# Patient Record
Sex: MALE | Race: WHITE
[De-identification: names, ages, dates, MRNs, and addresses within clinical notes are randomized per-mention and may not be internally consistent; named-entity substitution may affect disease eponyms.]

---

## 2021-05-17 ENCOUNTER — HOSPITAL ENCOUNTER (EMERGENCY)
Dept: HOSPITAL 47 - EC | Age: 61
End: 2021-05-17
Payer: COMMERCIAL

## 2021-05-17 VITALS — TEMPERATURE: 97.9 F

## 2021-05-17 DIAGNOSIS — Z88.0: ICD-10-CM

## 2021-05-17 DIAGNOSIS — J96.01: ICD-10-CM

## 2021-05-17 DIAGNOSIS — D64.9: ICD-10-CM

## 2021-05-17 DIAGNOSIS — I10: ICD-10-CM

## 2021-05-17 DIAGNOSIS — E87.5: ICD-10-CM

## 2021-05-17 DIAGNOSIS — E87.2: ICD-10-CM

## 2021-05-17 DIAGNOSIS — Z86.16: ICD-10-CM

## 2021-05-17 DIAGNOSIS — Z46.82: ICD-10-CM

## 2021-05-17 DIAGNOSIS — E03.9: ICD-10-CM

## 2021-05-17 DIAGNOSIS — E87.0: ICD-10-CM

## 2021-05-17 DIAGNOSIS — I46.9: Primary | ICD-10-CM

## 2021-05-17 DIAGNOSIS — Z79.01: ICD-10-CM

## 2021-05-17 DIAGNOSIS — Z79.899: ICD-10-CM

## 2021-05-17 DIAGNOSIS — Z87.11: ICD-10-CM

## 2021-05-17 LAB
ALBUMIN SERPL-MCNC: 2.2 G/DL (ref 3.5–5)
ALP SERPL-CCNC: 134 U/L (ref 38–126)
ALT SERPL-CCNC: 85 U/L (ref 4–49)
ANION GAP SERPL CALC-SCNC: 21 MMOL/L
APTT BLD: 17.9 SEC (ref 22–30)
AST SERPL-CCNC: 341 U/L (ref 17–59)
BUN SERPL-SCNC: 25 MG/DL (ref 9–20)
CALCIUM SPEC-MCNC: 8 MG/DL (ref 8.4–10.2)
CELLS COUNTED: 200
CHLORIDE SERPL-SCNC: 113 MMOL/L (ref 98–107)
CO2 BLDA-SCNC: 13 MMOL/L (ref 19–24)
CO2 SERPL-SCNC: 13 MMOL/L (ref 22–30)
ERYTHROCYTE [DISTWIDTH] IN BLOOD BY AUTOMATED COUNT: 2.18 M/UL (ref 4.3–5.9)
ERYTHROCYTE [DISTWIDTH] IN BLOOD: 19.5 % (ref 11.5–15.5)
GLUCOSE BLD-MCNC: 168 MG/DL (ref 75–99)
GLUCOSE SERPL-MCNC: 142 MG/DL (ref 74–99)
HCO3 BLDA-SCNC: 12 MMOL/L (ref 21–25)
HCT VFR BLD AUTO: 23.1 % (ref 39–53)
HGB BLD-MCNC: 6.6 GM/DL (ref 13–17.5)
INR PPP: 1.2 (ref ?–1.2)
LIPASE SERPL-CCNC: 148 U/L (ref 23–300)
LYMPHOCYTES # BLD MANUAL: 2.25 K/UL (ref 1–4.8)
MAGNESIUM SPEC-SCNC: 2.1 MG/DL (ref 1.6–2.3)
MCH RBC QN AUTO: 30.3 PG (ref 25–35)
MCHC RBC AUTO-ENTMCNC: 28.6 G/DL (ref 31–37)
MCV RBC AUTO: 105.9 FL (ref 80–100)
METAMYELOCYTES # BLD: 0.5 K/UL
MONOCYTES # BLD MANUAL: 0.38 K/UL (ref 0–1)
MYELOCYTES # BLD MANUAL: 0.5 K/UL
NEUTROPHILS NFR BLD MANUAL: 64 %
NEUTS SEG # BLD MANUAL: 9.2 K/UL (ref 1.3–7.7)
PCO2 BLDA: 29 MMHG (ref 35–45)
PH BLDA: 7.22 [PH] (ref 7.35–7.45)
PH UR: 5.5 [PH] (ref 5–8)
PLATELET # BLD AUTO: 291 K/UL (ref 150–450)
PO2 BLDA: 387 MMHG (ref 83–108)
POTASSIUM SERPL-SCNC: 5.2 MMOL/L (ref 3.5–5.1)
PROT SERPL-MCNC: 5.1 G/DL (ref 6.3–8.2)
PROT UR QL: (no result)
PT BLD: 12.4 SEC (ref 9–12)
RBC UR QL: 5 /HPF (ref 0–5)
SODIUM SERPL-SCNC: 147 MMOL/L (ref 137–145)
SP GR UR: 1.01 (ref 1–1.03)
SQUAMOUS UR QL AUTO: 1 /HPF (ref 0–4)
UROBILINOGEN UR QL STRIP: <2 MG/DL (ref ?–2)
WBC # BLD AUTO: 12.5 K/UL (ref 3.8–10.6)
WBC # UR AUTO: 7 /HPF (ref 0–5)

## 2021-05-17 PROCEDURE — 36620 INSERTION CATHETER ARTERY: CPT

## 2021-05-17 PROCEDURE — 36556 INSERT NON-TUNNEL CV CATH: CPT

## 2021-05-17 PROCEDURE — 80053 COMPREHEN METABOLIC PANEL: CPT

## 2021-05-17 PROCEDURE — 93005 ELECTROCARDIOGRAM TRACING: CPT

## 2021-05-17 PROCEDURE — 84484 ASSAY OF TROPONIN QUANT: CPT

## 2021-05-17 PROCEDURE — 36415 COLL VENOUS BLD VENIPUNCTURE: CPT

## 2021-05-17 PROCEDURE — 85610 PROTHROMBIN TIME: CPT

## 2021-05-17 PROCEDURE — 94002 VENT MGMT INPAT INIT DAY: CPT

## 2021-05-17 PROCEDURE — 96374 THER/PROPH/DIAG INJ IV PUSH: CPT

## 2021-05-17 PROCEDURE — 83735 ASSAY OF MAGNESIUM: CPT

## 2021-05-17 PROCEDURE — 83690 ASSAY OF LIPASE: CPT

## 2021-05-17 PROCEDURE — 87635 SARS-COV-2 COVID-19 AMP PRB: CPT

## 2021-05-17 PROCEDURE — 86850 RBC ANTIBODY SCREEN: CPT

## 2021-05-17 PROCEDURE — 86901 BLOOD TYPING SEROLOGIC RH(D): CPT

## 2021-05-17 PROCEDURE — 83605 ASSAY OF LACTIC ACID: CPT

## 2021-05-17 PROCEDURE — 86900 BLOOD TYPING SEROLOGIC ABO: CPT

## 2021-05-17 PROCEDURE — 85730 THROMBOPLASTIN TIME PARTIAL: CPT

## 2021-05-17 PROCEDURE — 74018 RADEX ABDOMEN 1 VIEW: CPT

## 2021-05-17 PROCEDURE — 32551 INSERTION OF CHEST TUBE: CPT

## 2021-05-17 PROCEDURE — 85025 COMPLETE CBC W/AUTO DIFF WBC: CPT

## 2021-05-17 PROCEDURE — 71045 X-RAY EXAM CHEST 1 VIEW: CPT

## 2021-05-17 PROCEDURE — 86920 COMPATIBILITY TEST SPIN: CPT

## 2021-05-17 PROCEDURE — 87040 BLOOD CULTURE FOR BACTERIA: CPT

## 2021-05-17 PROCEDURE — 92950 HEART/LUNG RESUSCITATION CPR: CPT

## 2021-05-17 PROCEDURE — 81001 URINALYSIS AUTO W/SCOPE: CPT

## 2021-05-17 PROCEDURE — 99291 CRITICAL CARE FIRST HOUR: CPT

## 2021-05-17 PROCEDURE — 82805 BLOOD GASES W/O2 SATURATION: CPT

## 2021-05-17 NOTE — ED
General Adult HPI





- General


Chief complaint: Cardiac Arrest/CPR


Stated complaint: ROSC


Source: EMS


Mode of arrival: EMS


Limitations: altered mental status





- History of Present Illness


Initial comments: 


Dictation was produced using dragon dictation software. please excuse any 

grammatical, word or spelling errors. 





This patient was cared for during a federal and state declared state of 

emergency secondary to Covid 19





Chief Complaint: 60-year-old male brought in by EMS after cardiac arrest





History of Present Illness: Patient is a 60-year-old male he was brought in for 

cardiac arrest.  Patient currently resident at [facility].  He had a 

witnessed arrest in front of [facility] staff.  CPR was begun right away.  

Patient was observed to be ambulatory to the bathroom when he suddenly 

collapsed.  Supposedly patient recently had abdominal surgery unclear when.  

Patient had CPR performed for approximately 20 minutes.  Return spontaneous 

circulation was achieved.  Patient was intubated by EMS providers.  There was 

documentation sent over from [facility] in [facility].  States that his medical history

includes COVID-19, acute respiratory failure, anemia, hypothyroidism, peptic 

ulcer disease, dysphasia.














PHYSICAL EXAM:


General Impression: Obtunded, pale, pupils 2 mm


HEENT: Normocephalic atraumatic, dry mucous membranes


Cardiovascular: Heart regular rate and rhythm


Chest: Intubated, bilateral breath sounds, no air sounds over the epigastrium


Abdomen: abdomen soft, surgical scar clean dry and intact, there is a 2 coming 

out of his left abdomen


Musculoskeletal: Pulses present and equal in all extremities, no peripheral 

edema


Neurological: Pupils 2 mm, no movement


Skin: Intact with no visualized rashes








ED course: 60-year-old male with recent abdominal surgery presents to emergency 

department after cardiac arrest.  He had CPR for approximately 20 minutes.  

Arrest was witnessed and CPR is being evaluated by medicine [facility] staff.  Wanda 

was achieved after 20 minutes of CPR.  Vital signs upon arrival shows heart rate

of 83, respiratory rate of 18, blood pressure 82/41, 100% on mechanical 

ventilation with FiO2 100.  Arterial line and central venous catheter line was 

placed.  Patient started on levophed.  Broad-spectrum antibiotics were ordered.








EKG interpretation: Ventricular rate 70, normal sinus rhythm,.  Full 152, QRS 

104, . No ID prolongation, no QTC prolongation, no ST or T-wave changes 

noted.  





Laboratory evaluation was obtained.  Patient hemoglobin of 6.6.  Macrocytosis, 

leukocytosis 12.5.  Coag panel was within acceptable limits.  Arterial blood gas

shows pH of 7.22 with a bicarb of 12 and is pCO2 of 29 consistent with metabolic

acidosis.  Metabolic panel shows sodium 147, potassium 5.2, bicarb 13 anion gap 

21, BUN 25 with creatinine 1.25, lactic acidosis of 14.2, elevated liver 

enzymes, urinalysis shows 7 white blood cells.  Patient started on broad-

spectrum antibiotics.  Chest x-ray, x-ray was obtained.  Chest x-ray showed a 

right pneumothorax.  Chest tube thoracostomy was performed of the right 

hemithorax.  There is bilateral multifocal opacities could reflect atelectasis 

or infiltrate.  Repeat chest x-ray shows improvement of pneumothorax after chest

tube placement.  Patient's pressure did not significant improved with 

norepinephrine.  Vasopressin was added.  Bicarbonate drip was ordered.





Case was discussed Dr. Stafford who is willing to accept patients care to the ICU 

however intensive care unit at the time of our discussion is currently full. 

patient will be disposition once a bed becomes available.  Case is discussed 

with Dr. Ellis for x-ray findings concerning for pneumatosis and is willing 

to be a consultation.





At 553 patient became bradycardic and arterial line reading read no pulse.  CPR 

was initiated.  Patient is given multiple rounds of CPR, epinephrine, calcium, 

sodium bicarbonate, epinephrine.  Patient also was given a bolus of amiodarone. 

Patient had multiple rounds of ventricular tachycardia and ventricular 

fibrillation.  Defibrillation was performed multiple times.  Return of 

spontaneous circulation was unable to be achieved.  A total of approximately 20 

minutes of CPR was performed.  Point-of-care bedside ultrasound performed 

showing no cardiac activity compatible with life.  Patient was pronounced 

 at 6:13 PM.





Family was updated.  Edis, patient's brother-in-law arrived at the hospital.  He

was notified of a chin's death.  He did not want to visit with the patient.  

Case discussed the medical examiner who approves release to  home.





EKG interpretation: Ventricular rate 70, normal sinus rhythm, ID interval 52, 

, . No ID prolongation, no QTC prolongation, no ST or T-wave 

changes noted.  








- Related Data


                                Home Medications











 Medication  Instructions  Recorded  Confirmed


 


Acetaminophen [Acetaminophen ER] 650 mg PO Q6H PRN 21


 


Amiodarone HCl [Pacerone] 200 mg PO DAILY 21


 


Apixaban [Eliquis] 5 mg PO BID@0700,1900 21


 


Cholecalciferol [Vitamin D3 (25 25 mcg PO HS 21





Mcg = 1000 Iu)]   


 


Lansoprazole 30 mg PO BID-W/MEALS 21


 


Levothyroxine Sodium [Synthroid] 100 mcg PO DAILY@0600 21


 


Metoprolol Tartrate [Lopressor] 50 mg PO BID@0700,1900 21


 


Ondansetron [Zofran] 4 mg PO BID@0700,1600 21


 


Simvastatin [Zocor] 20 mg PO HS 21











                                    Allergies











Allergy/AdvReac Type Severity Reaction Status Date / Time


 


Penicillins Allergy  Unknown Verified 21 17:09














Review of Systems


ROS Statement: 


Those systems with pertinent positive or pertinent negative responses have been 

documented in the HPI.





ROS Other: All systems not noted in ROS Statement are negative.





Past Medical History


Past Medical History: Unable to Obtain


History of Any Multi-Drug Resistant Organisms: Unobtainable


Past Surgical History: Unable to Obtain


Past Psychological History: Unable to Obtain


Smoking Status: Unknown if ever smoked


Past Alcohol Use History: Unable to Obtain


Past Drug Use History: None Reported





General Exam


Limitations: altered mental status





Course


                                   Vital Signs











  21





  15:10 15:25 15:35


 


Temperature 97.4 F L  


 


Pulse Rate 83  73


 


Respiratory 18 16 31 H





Rate   


 


Blood Pressure 82/41  104/45


 


O2 Sat by Pulse 100  99





Oximetry   














  21





  15:40 15:45 15:50


 


Temperature   


 


Pulse Rate 75 75 72


 


Respiratory 28 H 24 40 H





Rate   


 


Blood Pressure 100/38 93/57 93/57


 


O2 Sat by Pulse 100 99 





Oximetry   














  21





  15:55 16:00 16:05


 


Temperature   


 


Pulse Rate 74 70 72


 


Respiratory 34 H 45 H 21





Rate   


 


Blood Pressure 130/92 99/78 122/85


 


O2 Sat by Pulse   100





Oximetry   














  21





  16:10 16:15 16:20


 


Temperature   


 


Pulse Rate 73 70 


 


Respiratory 24 25 H 





Rate   


 


Blood Pressure 59/34  51/24


 


O2 Sat by Pulse 98 100 100





Oximetry   














  21





  16:25 16:30 16:35


 


Temperature   


 


Pulse Rate 65 67 68


 


Respiratory 40 H 25 H 47 H





Rate   


 


Blood Pressure 111/30 47/29 75/20


 


O2 Sat by Pulse 100 100 99





Oximetry   














  21





  16:40 16:45 16:50


 


Temperature   


 


Pulse Rate 65 65 66


 


Respiratory 30 H 30 H 24





Rate   


 


Blood Pressure 107/23 118/20 60/34


 


O2 Sat by Pulse 100 99 99





Oximetry   














  21





  16:55 17:00 17:05


 


Temperature   


 


Pulse Rate 62 64 61


 


Respiratory 30 H 23 37 H





Rate   


 


Blood Pressure 84/30 96/42 48/32


 


O2 Sat by Pulse 99 99 98





Oximetry   














  21





  17:09 17:10 17:15


 


Temperature 97.9 F  


 


Pulse Rate 59 L 58 L 62


 


Respiratory 18 44 H 27 H





Rate   


 


Blood Pressure 89/30 89/30 72/38


 


O2 Sat by Pulse  100 100





Oximetry   














  21





  17:20 17:25 17:30


 


Temperature   


 


Pulse Rate 62 67 61


 


Respiratory 26 H 14 21





Rate   


 


Blood Pressure 58/29 73/21 97/36


 


O2 Sat by Pulse 99 98 100





Oximetry   














  21





  17:35 17:40


 


Temperature  


 


Pulse Rate 61 57 L


 


Respiratory 23 23





Rate  


 


Blood Pressure 72/56 69/50


 


O2 Sat by Pulse 100 100





Oximetry  








                         ABP, PAP, CO, CI - Last 8 Hours











Arterial Blood Pressure        86/40


 


Arterial Blood Pressure        78/37


 


Arterial Blood Pressure        75/34


 


Arterial Blood Pressure        69/32


 


Arterial Blood Pressure        72/29


 


Arterial Blood Pressure        68/28


 


Arterial Blood Pressure        53/22


 


Arterial Blood Pressure        55/23


 


Arterial Blood Pressure        62/27


 


Arterial Blood Pressure        54/24


 


Arterial Blood Pressure        54/23


 


Arterial Blood Pressure        53/22


 


Arterial Blood Pressure        66/9


 


Arterial Blood Pressure        53/22


 


Arterial Blood Pressure        50/15


 


Arterial Blood Pressure        48/12


 


Arterial Blood Pressure        38/11


 


Arterial Blood Pressure        49/20


 


Arterial Blood Pressure        42/20


 


Arterial Blood Pressure        45/22


 


Arterial Blood Pressure        46/21


 


Arterial Blood Pressure        66/26


 


Arterial Blood Pressure        71/27


 


Arterial Blood Pressure        81/31


 


Arterial Blood Pressure        88/29


 


Arterial Blood Pressure        30/18

















Procedures





- Arterial Line


  ** No standard instances


Consent Obtained: emergent situation


Size (Gauge): 14


Technique Used: other (ultrasound guided)


Post-Procedure: line sutured into place


Patient Tolerated Procedure: well


Complications: none





- Central Line Placement


  ** Right Femoral


Consent Obtained: verbal consent, emergent situation


Patient Placed on Monitor/Pulse Ox: Yes


MD Prep: mask, gown, gloves


Central Line Prep: Chlorhexidine scrub


Ultrasound Used for Placement: Yes


Central Line Lumen Inserted: triple


Central Line Position: good blood return, all ports aspirated, flushed, capped, 

sutured in place with 3-0 nylon


Dressing Applied: Tegaderm


Patient Tolerated Procedure: well


Complications: none





- Chest Tube Insertion


Consent Obtained: emergent situation


Side of Procedure: right


Indication: Pneumothorax


Placed on monitor/pulse oximetry: Yes


Site Prep: Chloroprep


Local Anesthesia: Lidocaine 1%, With Epi


Insertion Site: 5th Intercostal Space


Scalpel: #10


Open into Pleural Space Using: Nicole Clamp


Tube Size (Chilean): 28


Returns: Air


Sutured in Place: Yes


Type of Suture: Silk


Dressing Applied: Petroleum Gauze


Attached to Suction: Yes


Type of Suction: Pleuravac


Repeat X-ray Results: Lung Inflated


Patient Tolerated Procedure: well





Medical Decision Making





- Lab Data


Result diagrams: 


                                 21 15:36





                                 21 15:36


                                   Lab Results











  21 Range/Units





  15:10 15:36 15:36 


 


WBC   12.5 H   (3.8-10.6)  k/uL


 


RBC   2.18 L   (4.30-5.90)  m/uL


 


Hgb   6.6 L*   (13.0-17.5)  gm/dL


 


Hct   23.1 L   (39.0-53.0)  %


 


MCV   105.9 H   (80.0-100.0)  fL


 


MCH   30.3   (25.0-35.0)  pg


 


MCHC   28.6 L   (31.0-37.0)  g/dL


 


RDW   19.5 H   (11.5-15.5)  %


 


Plt Count   291   (150-450)  k/uL


 


MPV   10.5   


 


Neutrophils % (Manual)   64   %


 


Band Neuts % (Manual)   10   %


 


Lymphocytes % (Manual)   18   %


 


Monocytes % (Manual)   3   %


 


Metamyelocytes %   4   %


 


Myelocytes %   4   %


 


Neutrophils # (Manual)   9.20 H   (1.3-7.7)  k/uL


 


Lymphocytes # (Manual)   2.25   (1.0-4.8)  k/uL


 


Monocytes # (Manual)   0.38   (0-1.0)  k/uL


 


Metamyelocytes # (Man)   0.50 H   (0)  k/uL


 


Myelocytes # (Manual)   0.50 H   (0)  k/uL


 


Nucleated RBCs   13 H   (0-0)  /100 WBC


 


Manual Slide Review   Performed   


 


Polychromasia   Present   


 


Hypochromasia   Marked   


 


Anisocytosis   Slight   


 


Macrocytosis   Marked A   


 


PT    12.4 H  (9.0-12.0)  sec


 


INR    1.2 H  (<1.2)  


 


APTT    17.9 L  (22.0-30.0)  sec


 


Sample Site     


 


ABG pH     (7.35-7.45)  


 


ABG pCO2     (35-45)  mmHg


 


ABG pO2     ()  mmHg


 


ABG HCO3     (21-25)  mmol/L


 


ABG Total CO2     (19-24)  mmol/L


 


ABG O2 Saturation     (94-97)  %


 


ABG Base Excess     mmol/L


 


Henok Test     


 


FiO2     %


 


Sodium     (137-145)  mmol/L


 


Potassium     (3.5-5.1)  mmol/L


 


Chloride     ()  mmol/L


 


Carbon Dioxide     (22-30)  mmol/L


 


Anion Gap     mmol/L


 


BUN     (9-20)  mg/dL


 


Creatinine     (0.66-1.25)  mg/dL


 


Est GFR (CKD-EPI)AfAm     (>60 ml/min/1.73 sqM)  


 


Est GFR (CKD-EPI)NonAf     (>60 ml/min/1.73 sqM)  


 


Glucose     (74-99)  mg/dL


 


POC Glucose (mg/dL)  168 H    (75-99)  mg/dL


 


POC Glu Operater ID  Willing, Phoebe    


 


Lactic Ac Sepsis Rflx     


 


Plasma Lactic Acid Jai     (0.7-2.0)  mmol/L


 


Calcium     (8.4-10.2)  mg/dL


 


Magnesium     (1.6-2.3)  mg/dL


 


Total Bilirubin     (0.2-1.3)  mg/dL


 


AST     (17-59)  U/L


 


ALT     (4-49)  U/L


 


Alkaline Phosphatase     ()  U/L


 


Troponin I     (0.000-0.034)  ng/mL


 


Total Protein     (6.3-8.2)  g/dL


 


Albumin     (3.5-5.0)  g/dL


 


Lipase     ()  U/L


 


Urine Color     


 


Urine Appearance     (Clear)  


 


Urine pH     (5.0-8.0)  


 


Ur Specific Gravity     (1.001-1.035)  


 


Urine Protein     (Negative)  


 


Urine Glucose (UA)     (Negative)  


 


Urine Ketones     (Negative)  


 


Urine Blood     (Negative)  


 


Urine Nitrite     (Negative)  


 


Urine Bilirubin     (Negative)  


 


Urine Urobilinogen     (<2.0)  mg/dL


 


Ur Leukocyte Esterase     (Negative)  


 


Urine RBC     (0-5)  /hpf


 


Urine WBC     (0-5)  /hpf


 


Ur Squamous Epith Cells     (0-4)  /hpf


 


Urine Mucus     (None)  /hpf


 


Coronavirus (PCR)     (Not Detectd)  


 


Blood Type     


 


Blood Type Confirm     


 


Blood Type Recheck     


 


Bld Type Recheck Status     


 


Antibody Screen     


 


Crossmatch     


 


Spec Expiration Date     














  21 Range/Units





  15:36 15:36 15:36 


 


WBC     (3.8-10.6)  k/uL


 


RBC     (4.30-5.90)  m/uL


 


Hgb     (13.0-17.5)  gm/dL


 


Hct     (39.0-53.0)  %


 


MCV     (80.0-100.0)  fL


 


MCH     (25.0-35.0)  pg


 


MCHC     (31.0-37.0)  g/dL


 


RDW     (11.5-15.5)  %


 


Plt Count     (150-450)  k/uL


 


MPV     


 


Neutrophils % (Manual)     %


 


Band Neuts % (Manual)     %


 


Lymphocytes % (Manual)     %


 


Monocytes % (Manual)     %


 


Metamyelocytes %     %


 


Myelocytes %     %


 


Neutrophils # (Manual)     (1.3-7.7)  k/uL


 


Lymphocytes # (Manual)     (1.0-4.8)  k/uL


 


Monocytes # (Manual)     (0-1.0)  k/uL


 


Metamyelocytes # (Man)     (0)  k/uL


 


Myelocytes # (Manual)     (0)  k/uL


 


Nucleated RBCs     (0-0)  /100 WBC


 


Manual Slide Review     


 


Polychromasia     


 


Hypochromasia     


 


Anisocytosis     


 


Macrocytosis     


 


PT     (9.0-12.0)  sec


 


INR     (<1.2)  


 


APTT     (22.0-30.0)  sec


 


Sample Site     


 


ABG pH     (7.35-7.45)  


 


ABG pCO2     (35-45)  mmHg


 


ABG pO2     ()  mmHg


 


ABG HCO3     (21-25)  mmol/L


 


ABG Total CO2     (19-24)  mmol/L


 


ABG O2 Saturation     (94-97)  %


 


ABG Base Excess     mmol/L


 


Henok Test     


 


FiO2     %


 


Sodium  147 H    (137-145)  mmol/L


 


Potassium  5.2 H    (3.5-5.1)  mmol/L


 


Chloride  113 H    ()  mmol/L


 


Carbon Dioxide  13 L    (22-30)  mmol/L


 


Anion Gap  21    mmol/L


 


BUN  25 H    (9-20)  mg/dL


 


Creatinine  1.25    (0.66-1.25)  mg/dL


 


Est GFR (CKD-EPI)AfAm  73    (>60 ml/min/1.73 sqM)  


 


Est GFR (CKD-EPI)NonAf  64    (>60 ml/min/1.73 sqM)  


 


Glucose  142 H    (74-99)  mg/dL


 


POC Glucose (mg/dL)     (75-99)  mg/dL


 


POC Glu Operater ID     


 


Lactic Ac Sepsis Rflx     


 


Plasma Lactic Acid Jai   14.2 H*   (0.7-2.0)  mmol/L


 


Calcium  8.0 L    (8.4-10.2)  mg/dL


 


Magnesium  2.1    (1.6-2.3)  mg/dL


 


Total Bilirubin  0.3    (0.2-1.3)  mg/dL


 


AST  341 H    (17-59)  U/L


 


ALT  85 H    (4-49)  U/L


 


Alkaline Phosphatase  134 H    ()  U/L


 


Troponin I    <0.012  (0.000-0.034)  ng/mL


 


Total Protein  5.1 L    (6.3-8.2)  g/dL


 


Albumin  2.2 L    (3.5-5.0)  g/dL


 


Lipase  148    ()  U/L


 


Urine Color     


 


Urine Appearance     (Clear)  


 


Urine pH     (5.0-8.0)  


 


Ur Specific Gravity     (1.001-1.035)  


 


Urine Protein     (Negative)  


 


Urine Glucose (UA)     (Negative)  


 


Urine Ketones     (Negative)  


 


Urine Blood     (Negative)  


 


Urine Nitrite     (Negative)  


 


Urine Bilirubin     (Negative)  


 


Urine Urobilinogen     (<2.0)  mg/dL


 


Ur Leukocyte Esterase     (Negative)  


 


Urine RBC     (0-5)  /hpf


 


Urine WBC     (0-5)  /hpf


 


Ur Squamous Epith Cells     (0-4)  /hpf


 


Urine Mucus     (None)  /hpf


 


Coronavirus (PCR)     (Not Detectd)  


 


Blood Type     


 


Blood Type Confirm     


 


Blood Type Recheck     


 


Bld Type Recheck Status     


 


Antibody Screen     


 


Crossmatch     


 


Spec Expiration Date     














  21 Range/Units





  15:52 15:58 16:10 


 


WBC     (3.8-10.6)  k/uL


 


RBC     (4.30-5.90)  m/uL


 


Hgb     (13.0-17.5)  gm/dL


 


Hct     (39.0-53.0)  %


 


MCV     (80.0-100.0)  fL


 


MCH     (25.0-35.0)  pg


 


MCHC     (31.0-37.0)  g/dL


 


RDW     (11.5-15.5)  %


 


Plt Count     (150-450)  k/uL


 


MPV     


 


Neutrophils % (Manual)     %


 


Band Neuts % (Manual)     %


 


Lymphocytes % (Manual)     %


 


Monocytes % (Manual)     %


 


Metamyelocytes %     %


 


Myelocytes %     %


 


Neutrophils # (Manual)     (1.3-7.7)  k/uL


 


Lymphocytes # (Manual)     (1.0-4.8)  k/uL


 


Monocytes # (Manual)     (0-1.0)  k/uL


 


Metamyelocytes # (Man)     (0)  k/uL


 


Myelocytes # (Manual)     (0)  k/uL


 


Nucleated RBCs     (0-0)  /100 WBC


 


Manual Slide Review     


 


Polychromasia     


 


Hypochromasia     


 


Anisocytosis     


 


Macrocytosis     


 


PT     (9.0-12.0)  sec


 


INR     (<1.2)  


 


APTT     (22.0-30.0)  sec


 


Sample Site     


 


ABG pH     (7.35-7.45)  


 


ABG pCO2     (35-45)  mmHg


 


ABG pO2     ()  mmHg


 


ABG HCO3     (21-25)  mmol/L


 


ABG Total CO2     (19-24)  mmol/L


 


ABG O2 Saturation     (94-97)  %


 


ABG Base Excess     mmol/L


 


Henok Test     


 


FiO2     %


 


Sodium     (137-145)  mmol/L


 


Potassium     (3.5-5.1)  mmol/L


 


Chloride     ()  mmol/L


 


Carbon Dioxide     (22-30)  mmol/L


 


Anion Gap     mmol/L


 


BUN     (9-20)  mg/dL


 


Creatinine     (0.66-1.25)  mg/dL


 


Est GFR (CKD-EPI)AfAm     (>60 ml/min/1.73 sqM)  


 


Est GFR (CKD-EPI)NonAf     (>60 ml/min/1.73 sqM)  


 


Glucose     (74-99)  mg/dL


 


POC Glucose (mg/dL)     (75-99)  mg/dL


 


POC Glu Operater ID     


 


Lactic Ac Sepsis Rflx     


 


Plasma Lactic Acid Jai     (0.7-2.0)  mmol/L


 


Calcium     (8.4-10.2)  mg/dL


 


Magnesium     (1.6-2.3)  mg/dL


 


Total Bilirubin     (0.2-1.3)  mg/dL


 


AST     (17-59)  U/L


 


ALT     (4-49)  U/L


 


Alkaline Phosphatase     ()  U/L


 


Troponin I     (0.000-0.034)  ng/mL


 


Total Protein     (6.3-8.2)  g/dL


 


Albumin     (3.5-5.0)  g/dL


 


Lipase     ()  U/L


 


Urine Color    Yellow  


 


Urine Appearance    Cloudy  (Clear)  


 


Urine pH    5.5  (5.0-8.0)  


 


Ur Specific Gravity    1.014  (1.001-1.035)  


 


Urine Protein    1+ H  (Negative)  


 


Urine Glucose (UA)    Negative  (Negative)  


 


Urine Ketones    Negative  (Negative)  


 


Urine Blood    Trace H  (Negative)  


 


Urine Nitrite    Negative  (Negative)  


 


Urine Bilirubin    Negative  (Negative)  


 


Urine Urobilinogen    <2.0  (<2.0)  mg/dL


 


Ur Leukocyte Esterase    Negative  (Negative)  


 


Urine RBC    5  (0-5)  /hpf


 


Urine WBC    7 H  (0-5)  /hpf


 


Ur Squamous Epith Cells    1  (0-4)  /hpf


 


Urine Mucus    Rare H  (None)  /hpf


 


Coronavirus (PCR)     (Not Detectd)  


 


Blood Type  A Positive    


 


Blood Type Confirm   A Positive   


 


Blood Type Recheck  No Previous Record    


 


Bld Type Recheck Status  CABO Indicated    


 


Antibody Screen  NEGATIVE    


 


Crossmatch  See Detail    


 


Spec Expiration Date  2021 - 21 Range/Units





  16:11 16:33 17:33 


 


WBC     (3.8-10.6)  k/uL


 


RBC     (4.30-5.90)  m/uL


 


Hgb     (13.0-17.5)  gm/dL


 


Hct     (39.0-53.0)  %


 


MCV     (80.0-100.0)  fL


 


MCH     (25.0-35.0)  pg


 


MCHC     (31.0-37.0)  g/dL


 


RDW     (11.5-15.5)  %


 


Plt Count     (150-450)  k/uL


 


MPV     


 


Neutrophils % (Manual)     %


 


Band Neuts % (Manual)     %


 


Lymphocytes % (Manual)     %


 


Monocytes % (Manual)     %


 


Metamyelocytes %     %


 


Myelocytes %     %


 


Neutrophils # (Manual)     (1.3-7.7)  k/uL


 


Lymphocytes # (Manual)     (1.0-4.8)  k/uL


 


Monocytes # (Manual)     (0-1.0)  k/uL


 


Metamyelocytes # (Man)     (0)  k/uL


 


Myelocytes # (Manual)     (0)  k/uL


 


Nucleated RBCs     (0-0)  /100 WBC


 


Manual Slide Review     


 


Polychromasia     


 


Hypochromasia     


 


Anisocytosis     


 


Macrocytosis     


 


PT     (9.0-12.0)  sec


 


INR     (<1.2)  


 


APTT     (22.0-30.0)  sec


 


Sample Site   [place]   


 


ABG pH   7.22 L   (7.35-7.45)  


 


ABG pCO2   29 L   (35-45)  mmHg


 


ABG pO2   387 H   ()  mmHg


 


ABG HCO3   12 L   (21-25)  mmol/L


 


ABG Total CO2   13 L   (19-24)  mmol/L


 


ABG O2 Saturation   99.8 H   (94-97)  %


 


ABG Base Excess   -15.7   mmol/L


 


Henok Test   Yes   


 


FiO2   100   %


 


Sodium     (137-145)  mmol/L


 


Potassium     (3.5-5.1)  mmol/L


 


Chloride     ()  mmol/L


 


Carbon Dioxide     (22-30)  mmol/L


 


Anion Gap     mmol/L


 


BUN     (9-20)  mg/dL


 


Creatinine     (0.66-1.25)  mg/dL


 


Est GFR (CKD-EPI)AfAm     (>60 ml/min/1.73 sqM)  


 


Est GFR (CKD-EPI)NonAf     (>60 ml/min/1.73 sqM)  


 


Glucose     (74-99)  mg/dL


 


POC Glucose (mg/dL)     (75-99)  mg/dL


 


POC Glu Operater ID     


 


Lactic Ac Sepsis Rflx  Y    


 


Plasma Lactic Acid Jai     (0.7-2.0)  mmol/L


 


Calcium     (8.4-10.2)  mg/dL


 


Magnesium     (1.6-2.3)  mg/dL


 


Total Bilirubin     (0.2-1.3)  mg/dL


 


AST     (17-59)  U/L


 


ALT     (4-49)  U/L


 


Alkaline Phosphatase     ()  U/L


 


Troponin I     (0.000-0.034)  ng/mL


 


Total Protein     (6.3-8.2)  g/dL


 


Albumin     (3.5-5.0)  g/dL


 


Lipase     ()  U/L


 


Urine Color     


 


Urine Appearance     (Clear)  


 


Urine pH     (5.0-8.0)  


 


Ur Specific Gravity     (1.001-1.035)  


 


Urine Protein     (Negative)  


 


Urine Glucose (UA)     (Negative)  


 


Urine Ketones     (Negative)  


 


Urine Blood     (Negative)  


 


Urine Nitrite     (Negative)  


 


Urine Bilirubin     (Negative)  


 


Urine Urobilinogen     (<2.0)  mg/dL


 


Ur Leukocyte Esterase     (Negative)  


 


Urine RBC     (0-5)  /hpf


 


Urine WBC     (0-5)  /hpf


 


Ur Squamous Epith Cells     (0-4)  /hpf


 


Urine Mucus     (None)  /hpf


 


Coronavirus (PCR)    Not Detected  (Not Detectd)  


 


Blood Type     


 


Blood Type Confirm     


 


Blood Type Recheck     


 


Bld Type Recheck Status     


 


Antibody Screen     


 


Crossmatch     


 


Spec Expiration Date     














Critical Care Time


Critical Care Time: Yes


Total Critical Care Time: 72





Disposition


Clinical Impression: 


 Cardiac arrest





Disposition: 


Referrals: 


None,Stated [REFERRING] - 1-2 days


Preliminary Cause of Death: cardiac arrest

## 2021-05-17 NOTE — XR
EXAMINATION TYPE: XR chest 1V portable

 

DATE OF EXAM: 5/17/2021

 

COMPARISON: Chest x-ray earlier today

 

HISTORY: Right-sided chest tube placement.

 

TECHNIQUE: Single AP portable frontal semiupright view of the chest is obtained.

 

FINDINGS:  Persistent low-lying endotracheal tube just past tereza. Stable orogastric tube.

 

New Right-sided chest tube with reexpanded right lung. New adjacent right subcutaneous emphysema. Mul
tifocal right mid lung and left basilar opacities remain present. Osseous structures are intact.

 

IMPRESSION:  

1. No residual right-sided pneumothorax after interval right-sided chest tube placement..

2. Stable low lying endotracheal tube should be pulled back 6 cm.

3. Persistent right hilar and left basilar acute infiltrate and/or atelectasis.

## 2021-05-17 NOTE — XR
EXAMINATION TYPE: XR chest 1V portable

 

DATE OF EXAM: 5/17/2021

 

COMPARISON: NONE

 

HISTORY: Witnessed cardiac arrest.

 

TECHNIQUE: Single AP portable frontal supine view of the chest is obtained.

 

FINDINGS: There is oral gastric tube projecting below diaphragm. There is an endotracheal tube termin
ating past tereza into right mainstem bronchus needs to be retracted roughly 6 to 7 cm.

 

Overlying EKG leads along with right-sided defibrillator pad. There is small to moderate size right u
pper to midlung pneumothorax estimated at 25-30%. Right central opacities. No mediastinal shift. Patc
hy left sided opacity is greatest on the retrocardiac region. Osseous structures are intact.

 

Impression:

1. Low lying endotracheal tube, recommend pulling back 6 to 7 cm.

2. Small to moderate-sized right pneumothorax estimated at 25-30%.

3. Bilateral multifocal opacities could reflect atelectasis and/or infiltrate. No significant mediast
inal shift.

 

Critical results communicated to ordering ER physician via telephone at time of dictation.

## 2021-05-17 NOTE — HP
HISTORY AND PHYSICAL



DATE OF SERVICE:

05/17/2021



CHIEF COMPLAINT:

Cardiac arrest.



HISTORY OF PRESENT ILLNESS:

This 60-year-old gentleman with a past medical history of multiple medical problems,

including hypertension, history of hypothyroidism, hyperlipidemia, being followed by

Dr. Salas in the [facility], was admitted to [facility] after a cardiac arrest.

The patient had a witnessed arrest and the patient subsequently underwent CPR.  The

patient was taken to [facility] and admitted for further evaluation and

treatment.  The patient suffered acute hypoxic respiratory failure.  Patient is

mechanically intubated.  The patient also had a right pneumothorax. Chest tube was

inserted.  The patient also had multiple surgeries at [facility],

including possibly laparoscopy and other surgeries. The patient also has a drain in the

stomach. Patient also had a history of COVID-19 and acute respiratory failure and

peptic ulcer disease recently. There is no history of any trauma. Patient was unable to

give a coherent history. Most of the history was taken from my discussion with staff

and review of the chart.



PAST MEDICAL HISTORY:

History of COVID-19, history of acute respiratory failure, anemia, hypothyroidism,

peptic ulcer disease, dysphagia.



HOME MEDICATIONS:

Acetaminophen, Zofran, Lopressor, lansoprazole, vitamin D3, Eliquis, Synthroid, Zocor,

Pacerone.



ALLERGIES:

PENICILLIN.



Family history, social history, review of systems could not be taken.



PHYSICAL EXAMINATION:

Patient is mechanically intubated.  The patient is unresponsive.  Pulse is 62, blood

pressure 70/30, respiration 27, temperature 97.9, pulse ox 100% _____.

HEENT: Conjunctivae normal. Oral mucosa moist.

NECK: No jugular venous distention. No carotid bruit. No lymph node enlargement.

CARDIOVASCULAR SYSTEM:  S1, S2 muffled.

RESPIRATORY SYSTEM: Breath sounds diminished at the bases.  A few scattered rhonchi.

ABDOMEN: Soft, distended. Right chest tube present.

LEGS: No edema. No swelling.

NERVOUS SYSTEM: Unresponsive.



LABS:

WBC 12.3, hemoglobin 6.6.  INR is 1.2. ABGs noted.



ASSESSMENT:

1. Acute cardiac arrest, possibly secondary to sepsis secondary intraabdominal

    infection.

2. Acute hypoxic respiratory failure, on mechanical ventilation.

3. Acute respiratory acidosis.

4. Severe anemia.  Rule out blood-loss anemia. Exact etiology undetermined.

5. History of recent COVID-19 infection.

6. Hypernatremia.

7. Hyperkalemia.

8. Elevated plasma lactic acid, 14.2.

9. History of increased AST, ALT, possibly hepatitis.

10.History of hypertension.

11.Hypothyroidism.

12.History of peptic ulcer disease.

13.Dysphagia.

14.FULL CODE.



RECOMMENDATIONS AND DISCUSSION:

In this 60-year-old gentleman who presented with multiple medical issues, we will

monitor the patient closely, continue the current medications, continue symptomatic

treatment, continue the antibiotics.  Continue with pressor support. The patient is on

Levophed and vasopressor. Evaluated closely and admitted to ICU, but the patient's

condition is rapidly deteriorating at this time.  The patient's brother is en route and

we will discuss the case with the patient's brother once he reaches here regarding the

extremely guarded prognosis. Further recommendations to follow. Prognosis guarded.  See

ER notes and multiple other consultation notes for further information.





MMODL / IJN: [id number] / Job#: [id number]

## 2021-05-17 NOTE — XR
EXAMINATION TYPE: XR abdomen 1V

 

DATE OF EXAM: 5/17/2021 3:45 PM

 

CLINICAL HISTORY:  Pain. Witnessed cardiac arrest.

 

TECHNIQUE:  Two portable supine KUB images of the abdomen are obtained.

 

COMPARISON: None.

 

FINDINGS: Nasogastric tube projects below diaphragm. Gas seen in nondistended stomach. Some scattered
 gas in prominent small and large bowel loops. Right central femoral venous catheter. Left lower quad
rant catheter presumed peritoneal dialysis catheter. Suspicious foci of gas in right lower quadrant a
nd upper pelvis could reflect gas within the wall of the cecum. Osseous structures are intact. 

 

IMPRESSION: 

 

Overall nonspecific bowel gas pattern. Cannot exclude pneumatosis in the cecum. CT follow-up is order
ed.